# Patient Record
Sex: FEMALE | Race: OTHER | NOT HISPANIC OR LATINO | URBAN - METROPOLITAN AREA
[De-identification: names, ages, dates, MRNs, and addresses within clinical notes are randomized per-mention and may not be internally consistent; named-entity substitution may affect disease eponyms.]

---

## 2017-07-08 ENCOUNTER — EMERGENCY (EMERGENCY)
Facility: HOSPITAL | Age: 42
LOS: 1 days | Discharge: HOME | End: 2017-07-08
Admitting: EMERGENCY MEDICINE
Payer: COMMERCIAL

## 2017-07-08 VITALS
OXYGEN SATURATION: 98 % | TEMPERATURE: 99 F | RESPIRATION RATE: 16 BRPM | HEIGHT: 63 IN | SYSTOLIC BLOOD PRESSURE: 112 MMHG | DIASTOLIC BLOOD PRESSURE: 56 MMHG | HEART RATE: 68 BPM | WEIGHT: 130.07 LBS
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
## 2017-07-08 DIAGNOSIS — S93.331A OTHER SUBLUXATION OF RIGHT FOOT, INITIAL ENCOUNTER: ICD-10-CM

## 2017-07-08 DIAGNOSIS — S92.331A DISPLACED FRACTURE OF THIRD METATARSAL BONE, RIGHT FOOT, INITIAL ENCOUNTER FOR CLOSED FRACTURE: ICD-10-CM

## 2017-07-08 DIAGNOSIS — S92.341A DISPLACED FRACTURE OF FOURTH METATARSAL BONE, RIGHT FOOT, INITIAL ENCOUNTER FOR CLOSED FRACTURE: ICD-10-CM
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
## 2017-07-08 PROCEDURE — 99283 EMERGENCY DEPT VISIT LOW MDM: CPT | Mod: 25

## 2017-07-08 PROCEDURE — 99284 EMERGENCY DEPT VISIT MOD MDM: CPT | Mod: 25

## 2017-07-08 PROCEDURE — 73630 X-RAY EXAM OF FOOT: CPT

## 2017-07-08 PROCEDURE — 73630 X-RAY EXAM OF FOOT: CPT | Mod: 26

## 2017-07-08 PROCEDURE — 73630 X-RAY EXAM OF FOOT: CPT | Mod: 26,LT

## 2017-07-08 NOTE — ED PROVIDER NOTE - MUSCULOSKELETAL, MLM
L LE: no knee tend, no tib / fib tend, no calf tend, no calf swelling, no tend over medial or lateral malleolus, achilles tendon intact, + resolving ecchymosis over 3rd and 4th distal metatarsals over dorsum of foot, + point tend over 3rd and 4th distal metatarsals, no swelling, + light touch, FROM

## 2017-07-08 NOTE — ED PROVIDER NOTE - OBJECTIVE STATEMENT
The pt is a 41 y/o F, who presents to ED c/o L foot fx - fell in [place] 4 d ago, had xrays that "showed a hairline fx" - given crutches and a boot, here for eval. Pt states no pain at rest, pain w/weight baring only, taking advil w/relief. Denies numbness or tingling to toes, ankle pain, knee or leg pain, calf pain

## 2017-07-08 NOTE — ED ADULT TRIAGE NOTE - CHIEF COMPLAINT QUOTE
Pt c/o L foot pain, states she has two hairline fractures after a fall 4 days ago in [place], was placed in an ortho boot.

## 2017-07-08 NOTE — ED PROVIDER NOTE - MEDICAL DECISION MAKING DETAILS
pt s/p fall a wk ago - sustained fx of foot - has a hairline fx 3rd and + fx of 4th distal metatarsal, neurovascular intact, placed into cam boot, to [facility] and f/u w/ortho in 1-2 wks, to con't prn ibuprofen, pt understands and agrees w/plan

## 2019-05-29 PROBLEM — Z00.00 ENCOUNTER FOR PREVENTIVE HEALTH EXAMINATION: Status: ACTIVE | Noted: 2019-05-29

## 2019-05-30 ENCOUNTER — APPOINTMENT (OUTPATIENT)
Dept: NEUROLOGY | Facility: CLINIC | Age: 44
End: 2019-05-30
Payer: COMMERCIAL

## 2019-05-30 VITALS
WEIGHT: 135 LBS | DIASTOLIC BLOOD PRESSURE: 81 MMHG | HEIGHT: 63 IN | BODY MASS INDEX: 23.92 KG/M2 | SYSTOLIC BLOOD PRESSURE: 127 MMHG | HEART RATE: 80 BPM | OXYGEN SATURATION: 98 %

## 2019-05-30 DIAGNOSIS — Z86.59 PERSONAL HISTORY OF OTHER MENTAL AND BEHAVIORAL DISORDERS: ICD-10-CM

## 2019-05-30 DIAGNOSIS — R56.9 UNSPECIFIED CONVULSIONS: ICD-10-CM

## 2019-05-30 DIAGNOSIS — Z83.3 FAMILY HISTORY OF DIABETES MELLITUS: ICD-10-CM

## 2019-05-30 DIAGNOSIS — Z78.9 OTHER SPECIFIED HEALTH STATUS: ICD-10-CM

## 2019-05-30 PROCEDURE — 99205 OFFICE O/P NEW HI 60 MIN: CPT

## 2019-06-04 ENCOUNTER — OTHER (OUTPATIENT)
Age: 44
End: 2019-06-04

## 2019-06-11 ENCOUNTER — FORM ENCOUNTER (OUTPATIENT)
Age: 44
End: 2019-06-11

## 2019-06-12 ENCOUNTER — OUTPATIENT (OUTPATIENT)
Dept: OUTPATIENT SERVICES | Facility: HOSPITAL | Age: 44
LOS: 1 days | End: 2019-06-12
Payer: COMMERCIAL

## 2019-06-12 ENCOUNTER — APPOINTMENT (OUTPATIENT)
Dept: MRI IMAGING | Facility: HOSPITAL | Age: 44
End: 2019-06-12
Payer: COMMERCIAL

## 2019-06-12 PROCEDURE — 70551 MRI BRAIN STEM W/O DYE: CPT

## 2019-06-12 PROCEDURE — 70551 MRI BRAIN STEM W/O DYE: CPT | Mod: 26

## 2019-06-28 ENCOUNTER — APPOINTMENT (OUTPATIENT)
Dept: NEUROLOGY | Facility: CLINIC | Age: 44
End: 2019-06-28
Payer: COMMERCIAL

## 2019-06-28 ENCOUNTER — TRANSCRIPTION ENCOUNTER (OUTPATIENT)
Age: 44
End: 2019-06-28

## 2019-06-28 PROCEDURE — 95819 EEG AWAKE AND ASLEEP: CPT

## 2019-06-29 ENCOUNTER — APPOINTMENT (OUTPATIENT)
Dept: NEUROLOGY | Facility: CLINIC | Age: 44
End: 2019-06-29

## 2019-06-29 PROCEDURE — 95953: CPT

## 2021-04-06 ENCOUNTER — APPOINTMENT (OUTPATIENT)
Dept: DISASTER EMERGENCY | Facility: OTHER | Age: 46
End: 2021-04-06
Payer: COMMERCIAL

## 2021-04-06 PROCEDURE — 0001A: CPT

## 2021-04-07 ENCOUNTER — APPOINTMENT (OUTPATIENT)
Dept: DISASTER EMERGENCY | Facility: OTHER | Age: 46
End: 2021-04-07

## 2021-04-27 ENCOUNTER — APPOINTMENT (OUTPATIENT)
Dept: DISASTER EMERGENCY | Facility: OTHER | Age: 46
End: 2021-04-27

## 2021-04-27 ENCOUNTER — APPOINTMENT (OUTPATIENT)
Age: 46
End: 2021-04-27
Payer: COMMERCIAL

## 2021-04-27 PROCEDURE — 0002A: CPT

## 2021-05-06 ENCOUNTER — TRANSCRIPTION ENCOUNTER (OUTPATIENT)
Age: 46
End: 2021-05-06

## 2025-01-27 ENCOUNTER — LABORATORY RESULT (OUTPATIENT)
Age: 50
End: 2025-01-27

## 2025-01-27 ENCOUNTER — APPOINTMENT (OUTPATIENT)
Dept: OBGYN | Facility: CLINIC | Age: 50
End: 2025-01-27
Payer: COMMERCIAL

## 2025-01-27 VITALS
SYSTOLIC BLOOD PRESSURE: 143 MMHG | OXYGEN SATURATION: 98 % | DIASTOLIC BLOOD PRESSURE: 90 MMHG | BODY MASS INDEX: 24.98 KG/M2 | HEART RATE: 88 BPM | WEIGHT: 141 LBS

## 2025-01-27 DIAGNOSIS — Z01.419 ENCOUNTER FOR GYNECOLOGICAL EXAMINATION (GENERAL) (ROUTINE) W/OUT ABNORMAL FINDINGS: ICD-10-CM

## 2025-01-27 DIAGNOSIS — N93.9 ABNORMAL UTERINE AND VAGINAL BLEEDING, UNSPECIFIED: ICD-10-CM

## 2025-01-27 DIAGNOSIS — Z78.9 OTHER SPECIFIED HEALTH STATUS: ICD-10-CM

## 2025-01-27 DIAGNOSIS — R68.82 DECREASED LIBIDO: ICD-10-CM

## 2025-01-27 PROCEDURE — 99386 PREV VISIT NEW AGE 40-64: CPT

## 2025-01-27 PROCEDURE — 99459 PELVIC EXAMINATION: CPT

## 2025-01-27 RX ORDER — ATOMOXETINE HYDROCHLORIDE 40 MG/1
40 CAPSULE ORAL
Refills: 0 | Status: ACTIVE | COMMUNITY

## 2025-01-27 RX ORDER — ESCITALOPRAM OXALATE 20 MG/1
20 TABLET ORAL
Refills: 0 | Status: ACTIVE | COMMUNITY

## 2025-01-29 LAB
BASOPHILS # BLD AUTO: 0.15 K/UL
BASOPHILS NFR BLD AUTO: 1.9 %
EOSINOPHIL # BLD AUTO: 0.32 K/UL
EOSINOPHIL NFR BLD AUTO: 4.2 %
ESTRADIOL SERPL-MCNC: 41 PG/ML
FSH SERPL-MCNC: 12.9 IU/L
HCT VFR BLD CALC: 39.9 %
HGB BLD-MCNC: 12.7 G/DL
HPV HIGH+LOW RISK DNA PNL CVX: NOT DETECTED
IMM GRANULOCYTES NFR BLD AUTO: 0.4 %
LH SERPL-ACNC: 9.4 IU/L
LYMPHOCYTES # BLD AUTO: 2.91 K/UL
LYMPHOCYTES NFR BLD AUTO: 37.8 %
MAN DIFF?: NORMAL
MCHC RBC-ENTMCNC: 29 PG
MCHC RBC-ENTMCNC: 31.8 G/DL
MCV RBC AUTO: 91.1 FL
MONOCYTES # BLD AUTO: 0.66 K/UL
MONOCYTES NFR BLD AUTO: 8.6 %
NEUTROPHILS # BLD AUTO: 3.63 K/UL
NEUTROPHILS NFR BLD AUTO: 47.1 %
PLATELET # BLD AUTO: 321 K/UL
RBC # BLD: 4.38 M/UL
RBC # FLD: 16.4 %
TSH SERPL-ACNC: 5.8 UIU/ML
WBC # FLD AUTO: 7.7 K/UL

## 2025-02-06 ENCOUNTER — TRANSCRIPTION ENCOUNTER (OUTPATIENT)
Age: 50
End: 2025-02-06

## 2025-02-06 LAB — CYTOLOGY CVX/VAG DOC THIN PREP: ABNORMAL

## 2025-02-25 ENCOUNTER — APPOINTMENT (OUTPATIENT)
Dept: OBGYN | Facility: CLINIC | Age: 50
End: 2025-02-25
